# Patient Record
Sex: FEMALE | Race: WHITE | Employment: UNEMPLOYED | URBAN - NONMETROPOLITAN AREA
[De-identification: names, ages, dates, MRNs, and addresses within clinical notes are randomized per-mention and may not be internally consistent; named-entity substitution may affect disease eponyms.]

---

## 2021-06-07 ENCOUNTER — HOSPITAL ENCOUNTER (EMERGENCY)
Age: 71
Discharge: HOME | End: 2021-06-08
Attending: STUDENT IN AN ORGANIZED HEALTH CARE EDUCATION/TRAINING PROGRAM
Payer: MEDICARE

## 2021-06-07 DIAGNOSIS — T78.3XXA ANGIOEDEMA, INITIAL ENCOUNTER: ICD-10-CM

## 2021-06-07 DIAGNOSIS — R73.9 HYPERGLYCEMIA: Primary | ICD-10-CM

## 2021-06-07 PROCEDURE — 96372 THER/PROPH/DIAG INJ SC/IM: CPT

## 2021-06-07 PROCEDURE — 99284 EMERGENCY DEPT VISIT MOD MDM: CPT

## 2021-06-08 ENCOUNTER — APPOINTMENT (OUTPATIENT)
Dept: GENERAL RADIOLOGY | Age: 71
End: 2021-06-08
Payer: MEDICARE

## 2021-06-08 VITALS
BODY MASS INDEX: 30.24 KG/M2 | HEIGHT: 59 IN | HEART RATE: 103 BPM | DIASTOLIC BLOOD PRESSURE: 70 MMHG | OXYGEN SATURATION: 96 % | TEMPERATURE: 98.4 F | SYSTOLIC BLOOD PRESSURE: 116 MMHG | RESPIRATION RATE: 18 BRPM | WEIGHT: 150 LBS

## 2021-06-08 LAB
A/G RATIO: 1.2 (ref 1.1–2.2)
ALBUMIN SERPL-MCNC: 4.4 G/DL (ref 3.4–5)
ALP BLD-CCNC: 127 U/L (ref 40–129)
ALT SERPL-CCNC: 24 U/L (ref 10–40)
ANION GAP SERPL CALCULATED.3IONS-SCNC: 19 MMOL/L (ref 3–16)
ANION GAP SERPL CALCULATED.3IONS-SCNC: 20 MMOL/L (ref 3–16)
AST SERPL-CCNC: 18 U/L (ref 15–37)
BASE EXCESS VENOUS: -2.4 MMOL/L (ref -3–3)
BASOPHILS ABSOLUTE: 0 K/UL (ref 0–0.2)
BASOPHILS RELATIVE PERCENT: 0.1 %
BILIRUB SERPL-MCNC: 0.4 MG/DL (ref 0–1)
BILIRUBIN URINE: NEGATIVE
BLOOD, URINE: NEGATIVE
BUN BLDV-MCNC: 32 MG/DL (ref 7–20)
BUN BLDV-MCNC: 33 MG/DL (ref 7–20)
CALCIUM SERPL-MCNC: 10.3 MG/DL (ref 8.3–10.6)
CALCIUM SERPL-MCNC: 10.7 MG/DL (ref 8.3–10.6)
CARBOXYHEMOGLOBIN: 2.1 % (ref 0–1.5)
CHLORIDE BLD-SCNC: 93 MMOL/L (ref 99–110)
CHLORIDE BLD-SCNC: 98 MMOL/L (ref 99–110)
CLARITY: CLEAR
CO2: 21 MMOL/L (ref 21–32)
CO2: 22 MMOL/L (ref 21–32)
COLOR: YELLOW
CREAT SERPL-MCNC: 1.1 MG/DL (ref 0.6–1.2)
CREAT SERPL-MCNC: 1.3 MG/DL (ref 0.6–1.2)
EOSINOPHILS ABSOLUTE: 0 K/UL (ref 0–0.6)
EOSINOPHILS RELATIVE PERCENT: 0 %
GFR AFRICAN AMERICAN: 49
GFR AFRICAN AMERICAN: 59
GFR NON-AFRICAN AMERICAN: 40
GFR NON-AFRICAN AMERICAN: 49
GLOBULIN: 3.7 G/DL
GLUCOSE BLD-MCNC: 359 MG/DL (ref 70–99)
GLUCOSE BLD-MCNC: 377 MG/DL (ref 70–99)
GLUCOSE BLD-MCNC: 481 MG/DL (ref 70–99)
GLUCOSE BLD-MCNC: 525 MG/DL (ref 70–99)
GLUCOSE URINE: >=1000 MG/DL
HCO3 VENOUS: 22.9 MMOL/L (ref 23–29)
HCT VFR BLD CALC: 38.3 % (ref 36–48)
HEMOGLOBIN: 12.7 G/DL (ref 12–16)
KETONES, URINE: NEGATIVE MG/DL
LEUKOCYTE ESTERASE, URINE: NEGATIVE
LYMPHOCYTES ABSOLUTE: 0.5 K/UL (ref 1–5.1)
LYMPHOCYTES RELATIVE PERCENT: 4.5 %
MCH RBC QN AUTO: 30.8 PG (ref 26–34)
MCHC RBC AUTO-ENTMCNC: 33.1 G/DL (ref 31–36)
MCV RBC AUTO: 93 FL (ref 80–100)
METHEMOGLOBIN VENOUS: 0.3 %
MICROSCOPIC EXAMINATION: ABNORMAL
MONOCYTES ABSOLUTE: 0.2 K/UL (ref 0–1.3)
MONOCYTES RELATIVE PERCENT: 1.7 %
NEUTROPHILS ABSOLUTE: 9.4 K/UL (ref 1.7–7.7)
NEUTROPHILS RELATIVE PERCENT: 93.7 %
NITRITE, URINE: NEGATIVE
O2 CONTENT, VEN: 13 VOL %
O2 SAT, VEN: 69 %
O2 THERAPY: ABNORMAL
PCO2, VEN: 41.3 MMHG (ref 40–50)
PDW BLD-RTO: 13.5 % (ref 12.4–15.4)
PERFORMED ON: ABNORMAL
PERFORMED ON: ABNORMAL
PH UA: 5 (ref 5–8)
PH VENOUS: 7.36 (ref 7.35–7.45)
PLATELET # BLD: 257 K/UL (ref 135–450)
PMV BLD AUTO: 9 FL (ref 5–10.5)
PO2, VEN: 37.5 MMHG (ref 25–40)
POTASSIUM REFLEX MAGNESIUM: 3.6 MMOL/L (ref 3.5–5.1)
POTASSIUM REFLEX MAGNESIUM: 3.8 MMOL/L (ref 3.5–5.1)
PROTEIN UA: NEGATIVE MG/DL
RBC # BLD: 4.12 M/UL (ref 4–5.2)
SODIUM BLD-SCNC: 135 MMOL/L (ref 136–145)
SODIUM BLD-SCNC: 138 MMOL/L (ref 136–145)
SPECIFIC GRAVITY UA: 1.01 (ref 1–1.03)
TCO2 CALC VENOUS: 24 MMOL/L
TOTAL PROTEIN: 8.1 G/DL (ref 6.4–8.2)
URINE TYPE: ABNORMAL
UROBILINOGEN, URINE: 0.2 E.U./DL
WBC # BLD: 10 K/UL (ref 4–11)

## 2021-06-08 PROCEDURE — 2580000003 HC RX 258: Performed by: STUDENT IN AN ORGANIZED HEALTH CARE EDUCATION/TRAINING PROGRAM

## 2021-06-08 PROCEDURE — 36415 COLL VENOUS BLD VENIPUNCTURE: CPT

## 2021-06-08 PROCEDURE — 85025 COMPLETE CBC W/AUTO DIFF WBC: CPT

## 2021-06-08 PROCEDURE — 80053 COMPREHEN METABOLIC PANEL: CPT

## 2021-06-08 PROCEDURE — 6370000000 HC RX 637 (ALT 250 FOR IP): Performed by: STUDENT IN AN ORGANIZED HEALTH CARE EDUCATION/TRAINING PROGRAM

## 2021-06-08 PROCEDURE — 82803 BLOOD GASES ANY COMBINATION: CPT

## 2021-06-08 PROCEDURE — 71045 X-RAY EXAM CHEST 1 VIEW: CPT

## 2021-06-08 PROCEDURE — 81003 URINALYSIS AUTO W/O SCOPE: CPT

## 2021-06-08 RX ORDER — 0.9 % SODIUM CHLORIDE 0.9 %
1000 INTRAVENOUS SOLUTION INTRAVENOUS ONCE
Status: COMPLETED | OUTPATIENT
Start: 2021-06-08 | End: 2021-06-08

## 2021-06-08 RX ORDER — EPINEPHRINE 0.3 MG/.3ML
0.3 INJECTION SUBCUTANEOUS ONCE
Qty: 0.3 ML | Refills: 0 | Status: SHIPPED | OUTPATIENT
Start: 2021-06-08 | End: 2021-06-08

## 2021-06-08 RX ORDER — HYDROCHLOROTHIAZIDE 25 MG/1
25 TABLET ORAL DAILY
COMMUNITY

## 2021-06-08 RX ADMIN — SODIUM CHLORIDE 1000 ML: 9 INJECTION, SOLUTION INTRAVENOUS at 01:27

## 2021-06-08 RX ADMIN — INSULIN HUMAN 5 UNITS: 100 INJECTION, SOLUTION PARENTERAL at 01:22

## 2021-06-08 NOTE — PROGRESS NOTES
Pt is from out of town, she reports that the woke up yesterday with the right side of her face swollen, that today it was her lips and tongue, she went to the doctor's earlier today and received steroid and benedryl, and  Now her sugar is n 500's, per pt    She got a raspy cough as well

## 2021-06-08 NOTE — ED PROVIDER NOTES
[facility]      CHIEF COMPLAINT  Hyperglycemia (pt state her BGL was 566. Believes it is the result of a allergic reaction this morning after being given a benadryl shot)     HISTORY OF PRESENT ILLNESS  Sweta Min is a 65 y.o. female  who presents to the ED complaining of hyperglycemia. Patient states that she was seen earlier at urgent care for an allergic reaction that she believes was either secondary to tomatoes or strawberries. She has never had an allergic reaction either of these foods in the past, states that she developed some facial swelling as well as tongue and lip swelling at that time. At urgent care, received steroids and Benadryl, since then her swelling has been improving. She denies any associated itching. She states that her tongue no longer feels swollen. She does take losartan, has no previous history of angioedema. She denies any throat swelling sensation. She states that she has had elevated blood sugars in the 500s. She denies any nausea vomiting, abdominal pain, diarrhea constipation, or other complaints. No other complaints, modifying factors or associated symptoms. I have reviewed the following from the nursing documentation. Past Medical History:   Diagnosis Date    Diabetes mellitus ([address]     Hypertension     Iron (Fe) deficiency anemia      Past Surgical History:   Procedure Laterality Date    HYSTERECTOMY       No family history on file.   Social History     Socioeconomic History    Marital status:      Spouse name: Not on file    Number of children: Not on file    Years of education: Not on file    Highest education level: Not on file   Occupational History    Not on file   Tobacco Use    Smoking status: Never Smoker    Smokeless tobacco: Never Used   Vaping Use    Vaping Use: Never used   Substance and Sexual Activity    Alcohol use: Not Currently    Drug use: Never    Sexual activity: Not Currently   Other Topics Concern    Not on file   Social History Narrative    Not on file     Social Determinants of Health     Financial Resource Strain:     Difficulty of Paying Living Expenses:    Food Insecurity:     Worried About Running Out of Food in the Last Year:     [address] in the Last Year:    Transportation Needs:     Lack of Transportation (Medical):  Lack of Transportation (Non-Medical):    Physical Activity:     Days of Exercise per Week:     Minutes of Exercise per Session:    Stress:     Feeling of Stress :    Social Connections:     Frequency of Communication with Friends and Family:     Frequency of Social Gatherings with Friends and Family:     Attends Jewish Services:     Active Member of Clubs or Organizations:     Attends Club or Organization Meetings:     Marital Status:    Intimate Partner Violence:     Fear of Current or Ex-Partner:     Emotionally Abused:     Physically Abused:     Sexually Abused:      No current facility-administered medications for this encounter. Current Outpatient Medications   Medication Sig Dispense Refill    hydroCHLOROthiazide (HYDRODIURIL) 25 MG tablet Take 25 mg by mouth daily      metFORMIN (GLUCOPHAGE) 1000 MG tablet Take 1,000 mg by mouth 2 times daily (with meals)      EPINEPHrine (EPIPEN 2-ALEXANDER) 0.3 MG/0.3ML SOAJ injection Inject 0.3 mLs into the muscle once for 1 dose Use as directed for allergic reaction 0.3 mL 0     Allergies   Allergen Reactions    Codeine        REVIEW OF SYSTEMS  10 systems reviewed, pertinent positives per HPI otherwise noted to be negative. PHYSICAL EXAM  /70   Pulse 103   Temp 98.4 °F (36.9 °C) (Oral)   Resp 18   Ht 4' 11\" (1.499 m)   Wt 150 lb (68 kg)   SpO2 96%   BMI 30.30 kg/m²    GENERAL APPEARANCE: Awake and alert. Cooperative. No oacute distress. HENT: Normocephalic. Atraumatic. Mucous membranes are moist.  She does have swelling periorbitally bilaterally, as well as her lips.   No tongue Calcium 10.7 (H) 8.3 - 10.6 mg/dL    Total Protein 8.1 6.4 - 8.2 g/dL    Albumin 4.4 3.4 - 5.0 g/dL    Albumin/Globulin Ratio 1.2 1.1 - 2.2    Total Bilirubin 0.4 0.0 - 1.0 mg/dL    Alkaline Phosphatase 127 40 - 129 U/L    ALT 24 10 - 40 U/L    AST 18 15 - 37 U/L    Globulin 3.7 g/dL   Blood Gas, Venous   Result Value Ref Range    pH, Jaime 7.361 7.350 - 7.450    pCO2, Jaime 41.3 40.0 - 50.0 mmHg    pO2, Jaime 37.5 25 - 40 mmHg    HCO3, Venous 22.9 (L) 23.0 - 29.0 mmol/L    Base Excess, Jaime -2.4 -3.0 - 3.0 mmol/L    O2 Sat, Jaime 69 Not Established %    Carboxyhemoglobin 2.1 (H) 0.0 - 1.5 %    MetHgb, Jaime 0.3 <1.5 %    TC02 (Calc), Jaime 24 Not Established mmol/L    O2 Content, Jaime 13 Not Established VOL %    O2 Therapy Unknown    Urinalysis, reflex to microscopic   Result Value Ref Range    Color, UA Yellow Straw/Yellow    Clarity, UA Clear Clear    Glucose, Ur >=1000 (A) Negative mg/dL    Bilirubin Urine Negative Negative    Ketones, Urine Negative Negative mg/dL    Specific Gravity, UA 1.010 1.005 - 1.030    Blood, Urine Negative Negative    pH, UA 5.0 5.0 - 8.0    Protein, UA Negative Negative mg/dL    Urobilinogen, Urine 0.2 <2.0 E.U./dL    Nitrite, Urine Negative Negative    Leukocyte Esterase, Urine Negative Negative    Microscopic Examination Not Indicated     Urine Type NotGiven    Basic Metabolic Panel w/ Reflex to MG   Result Value Ref Range    Sodium 138 136 - 145 mmol/L    Potassium reflex Magnesium 3.6 3.5 - 5.1 mmol/L    Chloride 98 (L) 99 - 110 mmol/L    CO2 21 21 - 32 mmol/L    Anion Gap 19 (H) 3 - 16    Glucose 377 (H) 70 - 99 mg/dL    BUN 32 (H) 7 - 20 mg/dL    CREATININE 1.1 0.6 - 1.2 mg/dL    GFR Non- 49 (A) >60    GFR  59 (A) >60    Calcium 10.3 8.3 - 10.6 mg/dL   POCT Glucose   Result Value Ref Range    POC Glucose 481 (H) 70 - 99 mg/dl    Performed on ACCU-CHEK    POCT Glucose   Result Value Ref Range    POC Glucose 359 (H) 70 - 99 mg/dl    Performed on ACCU-CHEK RADIOLOGY  XR CHEST PORTABLE   Final Result   No acute process. Large hiatal hernia. ED COURSE/MDM  Patient seen and evaluated. Old records reviewed. Labs and imaging reviewed and results discussed with patient. Patient is a 25-year-old female, presenting with concerns for elevated blood glucose after receiving a steroid shot for possible allergic reaction earlier today. Full HPI as detailed above. Upon arrival in the ED, vitals remarkable for tachycardia which improved throughout her stay in the department. Vitals otherwise reassuring. Patient is resting comfortable and is in no acute distress. She does have periorbital swelling as well as swelling of her lips, states that she had tongue swelling earlier today which is since resolved. No current concern for airway compromise. She is worried that she may have had an allergic reaction to strawberries or tomatoes however she has no history of allergies to these substances. She denies any other new exposures. She does take losartan for blood pressure. Findings are consistent with angioedema, possibly secondary to losartan. Patient was advised to discontinue losartan and continue her other blood pressure medications until she is able to follow-up with her primary care provider. It is still possible that she could also be having an allergic reaction to the strawberries or tomatoes however I find that this is less likely. Patient was treated with IV fluids here in the department, as well as insulin with improvement of her blood glucose to the 300s. She has a very mild anion gap of 19 but no acidosis. Is not in DKA at this time. Patient was reassessed and stated that she was feeling well. She will be discharged home. She feels that her swelling is continuing to decrease.   She was given a referral for PCP follow-up as she does not live in the area, advised to discontinue her ARB until she is able to follow-up with her primary care provider. Patient is comfortable in agreement with plan of care and was discharged. Given return precautions. During the patient's ED course, the patient was given:  Medications   0.9 % sodium chloride bolus (0 mLs Intravenous Stopped 6/8/21 0300)   insulin regular (HUMULIN R;NOVOLIN R) injection 5 Units (5 Units Subcutaneous Given 6/8/21 0122)        CLINICAL IMPRESSION  1. Hyperglycemia    2. Angioedema, initial encounter        Blood pressure 116/70, pulse 103, temperature 98.4 °F (36.9 °C), temperature source Oral, resp. rate 18, height 4' 11\" (1.499 m), weight 150 lb (68 kg), SpO2 96 %. DISPOSITION  Reymundo Browne was discharged to home in good condition. Patient was given scripts for the following medications. I counseled patient how to take these medications. Discharge Medication List as of 6/8/2021  3:48 AM      START taking these medications    Details   EPINEPHrine (EPIPEN 2-ALEXANDER) 0.3 MG/0.3ML SOAJ injection Inject 0.3 mLs into the muscle once for 1 dose Use as directed for allergic reaction, Disp-0.3 mL, [id number]             Follow-up with:  Maegan Copeland  [phone]  Schedule an appointment as soon as possible for a visit       [facility]. [facility]  [address]  [phone]  Go to   If symptoms worsen      DISCLAIMER: This chart was created using Dragon dictation software. Efforts were made by me to ensure accuracy, however some errors may be present due to limitations of this technology and occasionally words are not transcribed correctly.        Shirley Foote MD  06/08/21 7980

## 2021-06-11 ENCOUNTER — OFFICE VISIT (OUTPATIENT)
Dept: FAMILY MEDICINE CLINIC | Age: 71
End: 2021-06-11
Payer: MEDICARE

## 2021-06-11 VITALS
OXYGEN SATURATION: 98 % | HEIGHT: 59 IN | BODY MASS INDEX: 29.56 KG/M2 | WEIGHT: 146.6 LBS | SYSTOLIC BLOOD PRESSURE: 138 MMHG | TEMPERATURE: 97.8 F | HEART RATE: 101 BPM | DIASTOLIC BLOOD PRESSURE: 74 MMHG

## 2021-06-11 DIAGNOSIS — R60.0 BILATERAL LEG EDEMA: ICD-10-CM

## 2021-06-11 DIAGNOSIS — T38.0X5A STEROID-INDUCED HYPERGLYCEMIA: ICD-10-CM

## 2021-06-11 DIAGNOSIS — T78.3XXS ANGIOEDEMA, SEQUELA: ICD-10-CM

## 2021-06-11 DIAGNOSIS — R73.9 STEROID-INDUCED HYPERGLYCEMIA: ICD-10-CM

## 2021-06-11 DIAGNOSIS — Z79.4 TYPE 2 DIABETES MELLITUS WITHOUT COMPLICATION, WITH LONG-TERM CURRENT USE OF INSULIN (HCC): ICD-10-CM

## 2021-06-11 DIAGNOSIS — Z76.89 ENCOUNTER TO ESTABLISH CARE WITH NEW DOCTOR: Primary | ICD-10-CM

## 2021-06-11 DIAGNOSIS — E11.9 TYPE 2 DIABETES MELLITUS WITHOUT COMPLICATION, WITH LONG-TERM CURRENT USE OF INSULIN (HCC): ICD-10-CM

## 2021-06-11 DIAGNOSIS — D50.0 IRON DEFICIENCY ANEMIA DUE TO CHRONIC BLOOD LOSS: ICD-10-CM

## 2021-06-11 DIAGNOSIS — I10 ESSENTIAL HYPERTENSION: ICD-10-CM

## 2021-06-11 PROCEDURE — 3017F COLORECTAL CA SCREEN DOC REV: CPT | Performed by: FAMILY MEDICINE

## 2021-06-11 PROCEDURE — 1123F ACP DISCUSS/DSCN MKR DOCD: CPT | Performed by: FAMILY MEDICINE

## 2021-06-11 PROCEDURE — 1036F TOBACCO NON-USER: CPT | Performed by: FAMILY MEDICINE

## 2021-06-11 PROCEDURE — G8417 CALC BMI ABV UP PARAM F/U: HCPCS | Performed by: FAMILY MEDICINE

## 2021-06-11 PROCEDURE — 4040F PNEUMOC VAC/ADMIN/RCVD: CPT | Performed by: FAMILY MEDICINE

## 2021-06-11 PROCEDURE — 99204 OFFICE O/P NEW MOD 45 MIN: CPT | Performed by: FAMILY MEDICINE

## 2021-06-11 PROCEDURE — G8427 DOCREV CUR MEDS BY ELIG CLIN: HCPCS | Performed by: FAMILY MEDICINE

## 2021-06-11 PROCEDURE — 3046F HEMOGLOBIN A1C LEVEL >9.0%: CPT | Performed by: FAMILY MEDICINE

## 2021-06-11 PROCEDURE — 2022F DILAT RTA XM EVC RTNOPTHY: CPT | Performed by: FAMILY MEDICINE

## 2021-06-11 PROCEDURE — 1090F PRES/ABSN URINE INCON ASSESS: CPT | Performed by: FAMILY MEDICINE

## 2021-06-11 PROCEDURE — G8400 PT W/DXA NO RESULTS DOC: HCPCS | Performed by: FAMILY MEDICINE

## 2021-06-11 RX ORDER — ATORVASTATIN CALCIUM 10 MG/1
TABLET, FILM COATED ORAL
COMMUNITY
Start: 2021-06-03

## 2021-06-11 RX ORDER — CEPHRADINE 500 MG
CAPSULE ORAL
COMMUNITY

## 2021-06-11 RX ORDER — INSULIN GLARGINE 300 U/ML
INJECTION, SOLUTION SUBCUTANEOUS
COMMUNITY
Start: 2021-06-03

## 2021-06-11 RX ORDER — ASPIRIN 325 MG
325 TABLET ORAL DAILY
COMMUNITY

## 2021-06-11 RX ORDER — LANCING DEVICE
EACH MISCELLANEOUS
COMMUNITY
Start: 2021-06-03

## 2021-06-11 RX ORDER — PREDNISONE 10 MG/1
TABLET ORAL
COMMUNITY
Start: 2021-06-07

## 2021-06-11 RX ORDER — GLUCOSAM/CHON-MSM1/C/MANG/BOSW 500-416.6
TABLET ORAL
COMMUNITY
Start: 2021-06-03

## 2021-06-11 RX ORDER — LOSARTAN POTASSIUM 100 MG/1
TABLET ORAL
COMMUNITY
Start: 2021-06-03 | End: 2021-06-11 | Stop reason: HOSPADM

## 2021-06-11 RX ORDER — AMLODIPINE BESYLATE 5 MG/1
TABLET ORAL
COMMUNITY
Start: 2021-06-03

## 2021-06-11 RX ORDER — CHLORTHALIDONE 25 MG/1
TABLET ORAL
COMMUNITY
Start: 2021-06-03

## 2021-06-11 RX ORDER — ASCORBIC ACID 125 MG
TABLET,CHEWABLE ORAL
COMMUNITY

## 2021-06-11 RX ORDER — HYDROCORTISONE 10 MG/G
GEL TOPICAL
COMMUNITY

## 2021-06-11 RX ORDER — METFORMIN HYDROCHLORIDE 500 MG/1
TABLET, EXTENDED RELEASE ORAL
COMMUNITY
Start: 2021-06-03

## 2021-06-11 RX ORDER — CHOLECALCIFEROL (VITAMIN D3) 25 MCG
TABLET ORAL
COMMUNITY

## 2021-06-11 RX ORDER — PEN NEEDLE, DIABETIC 32GX 5/32"
NEEDLE, DISPOSABLE MISCELLANEOUS
COMMUNITY
Start: 2021-06-03

## 2021-06-11 RX ORDER — CALCIUM CITRATE/VITAMIN D3 200MG-6.25
TABLET ORAL
COMMUNITY
Start: 2021-06-03

## 2021-06-11 ASSESSMENT — PATIENT HEALTH QUESTIONNAIRE - PHQ9
SUM OF ALL RESPONSES TO PHQ9 QUESTIONS 1 & 2: 0
SUM OF ALL RESPONSES TO PHQ QUESTIONS 1-9: 0
1. LITTLE INTEREST OR PLEASURE IN DOING THINGS: 0
SUM OF ALL RESPONSES TO PHQ QUESTIONS 1-9: 0
2. FEELING DOWN, DEPRESSED OR HOPELESS: 0
SUM OF ALL RESPONSES TO PHQ QUESTIONS 1-9: 0

## 2021-06-11 NOTE — PROGRESS NOTES
Cherie Porras   65 y.o. female   1950    This is patient's first visit with me. Cherie Porras is here to establish care as their new PCP. Cherie Porras has a PMH significant for:    Patient Active Problem List   Diagnosis    Essential hypertension    Type 2 diabetes mellitus without complication, with long-term current use of insulin (HCC)    Iron deficiency anemia due to chronic blood loss    Angio-edema       Reason(s) for visit:   Chief Complaint   Patient presents with    Follow-Up from Hospital     Glucose was over 500. [facility] 6/7.  Medication Reaction       HPI:    Office visit encounter:    Patient is originally from [place] and will be here for the next 4 months or so to take care of her sister who has esophageal CA. She has been here in [place] for 3 months and is living with a family member in [place], [place]. Patient's sister has been receiving radiation and has responded overall well to treatment based on latest PET scan. Patient came here today for ER follow up. She was seen at [facility] ER on 6/7/2021 for hyperglycemia and suspected angioedema. Patient reported that earlier that day, she went to Urgent Care for swelling of jaw. She thought it was attributed to a fall the day before. She woke up the following day and noted that her lower lip was swollen and shortly thereafter her tongue became swollen. She stated that she had \"hives\" occured around her waist and then later saw some under left breast and then spread around her diaphragm belt line. She was given Benadryl and steroid and was told that her glucose would increase. Her glucose was checked and it read over 500 and then went to ER. She was only given bolus NS and 5 units of Humulin R since her glucose was 525. Lab work was not suggestive of early DKA. Patient stated that she feels back to normal.  She was discharged with rx of EpiPen.   She stated that this issue has never happened before and has no idea of the cause behind it. As far as she knows, there's no [facility] of angiodema. This issue has never occurred before. She denied taking any new meds (both rx and OTC). She has history of HTN & DM. Last A1c was about a 7 months ago and was between 7-8. She's on Toujeo and Metformin. FBS are usually on average around 120-140. For HTN, patient was on Losartan, Amlodipine, and either Chorthalidone/HCTZ (?). Patient isn't sure which thiazide she's taking. She has been on these meds for years. She was advised to stop Losartan by ER. Patient also reported that she has history of iron deficiency and has been on IV iron infusion for 15 years. She stated that it was due to blood loss and had workup done, but no clear source of bleeding was found. Last IV iron therapy was about 18 months ago. She's not taking oral iron. Denied issues recently of hematochezia/melena as well as bleeding elsewhere.       PDMP monitoring:  -Revealed no controlled medications written of any kind.   -Last report:   Last PDMP Roberto Carlos as Reviewed [facility]):  Review User Review Instant Review Result   LORY FALK 6/10/2021 10:02 PM Reviewed PDMP [1]         Allergies   Allergen Reactions    Codeine     Losartan Hives and Swelling       Current Outpatient Medications on File Prior to Visit   Medication Sig Dispense Refill    amLODIPine (NORVASC) 5 MG tablet       atorvastatin (LIPITOR) 10 MG tablet       chlorthalidone (HYGROTON) 25 MG tablet       TOUJEO SOLOSTAR 300 UNIT/ML SOPN       metFORMIN (GLUCOPHAGE-XR) 500 MG extended release tablet       predniSONE (DELTASONE) 10 MG tablet       aspirin 325 MG tablet Take 325 mg by mouth daily      Alpha-Lipoic Acid 200 MG CAPS Take by mouth      Cyanocobalamin (B-12) 5000 MCG CAPS Take by mouth      Cholecalciferol (VITAMIN D3) 25 MCG TABS Take by mouth      diphenhydrAMINE HCl (BENADRYL ALLERGY PO) Take by mouth      HYDROCORTISONE, TOPICAL, (CORTIZONE-10) 1 % GEL Apply topically      hydroCHLOROthiazide (HYDRODIURIL) 25 MG tablet Take 25 mg by mouth daily      TRUE METRIX BLOOD GLUCOSE TEST strip       UNIFINE PENTIPS 32G X 4 MM MISC       Lancet Devices (LEADER ADVANCED LANCING DEVICE) MISC       TRUEplus Lancets 30G MISC       metFORMIN (GLUCOPHAGE) 1000 MG tablet Take 1,000 mg by mouth 2 times daily (with meals)      EPINEPHrine (EPIPEN 2-ALEXANDER) 0.3 MG/0.3ML SOAJ injection Inject 0.3 mLs into the muscle once for 1 dose Use as directed for allergic reaction 0.3 mL 0     No current facility-administered medications on file prior to visit. Family History   Problem Relation Age of Onset    Heart Attack Mother     Stroke Father     Esophageal Cancer Sister 76    Kidney Cancer Sister 59       Social History     Tobacco Use    Smoking status: Never Smoker    Smokeless tobacco: Never Used   Substance Use Topics    Alcohol use: Not Currently        Lab Results   Component Value Date    WBC 10.0 06/08/2021    HGB 12.7 06/08/2021    HCT 38.3 06/08/2021    MCV 93.0 06/08/2021     06/08/2021       Chemistry        Component Value Date/Time     06/08/2021 0300    K 3.6 06/08/2021 0300    CL 98 (L) 06/08/2021 0300    CO2 21 06/08/2021 0300    BUN 32 (H) 06/08/2021 0300    CREATININE 1.1 06/08/2021 0300        Component Value Date/Time    CALCIUM 10.3 06/08/2021 0300    ALKPHOS 127 06/08/2021 0015    AST 18 06/08/2021 0015    ALT 24 06/08/2021 0015    BILITOT 0.4 06/08/2021 0015          Lab Results   Component Value Date    ALT 24 06/08/2021    AST 18 06/08/2021    ALKPHOS 127 06/08/2021    BILITOT 0.4 06/08/2021     No results found for: LABA1C  No results found for: EAG    Review of Systems   Constitutional: Negative for activity change, appetite change, fatigue, fever and unexpected weight change. HENT: Negative for congestion, rhinorrhea, sinus pressure and trouble swallowing. Respiratory: Negative for cough, chest tightness, shortness of breath and wheezing. Cardiovascular: Negative for chest pain, palpitations and leg swelling. Gastrointestinal: Negative for abdominal distention, abdominal pain, blood in stool, constipation, diarrhea, nausea and vomiting. Genitourinary: Negative for dysuria, frequency and hematuria. Musculoskeletal: Negative for arthralgias and back pain. Skin: Negative for color change and rash. Neurological: Negative for dizziness, weakness, light-headedness, numbness and headaches. Psychiatric/Behavioral: Negative for sleep disturbance. Wt Readings from Last 3 Encounters:   06/11/21 146 lb 9.6 oz (66.5 kg)   06/08/21 150 lb (68 kg)       BP Readings from Last 3 Encounters:   06/11/21 138/74   06/08/21 116/70       Pulse Readings from Last 3 Encounters:   06/11/21 101   06/08/21 103       /74   Pulse 101   Temp 97.8 °F (36.6 °C)   Ht 4' 11\" (1.499 m)   Wt 146 lb 9.6 oz (66.5 kg)   SpO2 98%   BMI 29.61 kg/m²      Physical Exam  Vitals reviewed. Constitutional:       General: She is awake. She is not in acute distress. Appearance: She is overweight. She is not ill-appearing or diaphoretic. HENT:      Head: Normocephalic and atraumatic. Right Ear: External ear normal.      Left Ear: External ear normal.      Nose: Nose normal.   Eyes:      General: Lids are normal. Gaze aligned appropriately. No scleral icterus. Right eye: No discharge. Left eye: No discharge. Extraocular Movements: Extraocular movements intact. Conjunctiva/sclera: Conjunctivae normal.   Neck:      Trachea: Phonation normal.   Cardiovascular:      Rate and Rhythm: Normal rate and regular rhythm. Pulmonary:      Effort: Pulmonary effort is normal. No respiratory distress. Breath sounds: No wheezing, rhonchi or rales. Abdominal:      General: Abdomen is flat. There is no distension. Palpations: Abdomen is soft. Musculoskeletal:         General: No deformity. Normal range of motion.       Cervical back: Normal range of motion. No erythema. Right lower le+ Pitting Edema present. Left lower le+ Pitting Edema present. Skin:     Coloration: Skin is not cyanotic, jaundiced or pale. Findings: No abrasion, abscess, bruising, ecchymosis, erythema, signs of injury, laceration, lesion, petechiae, rash or wound. Neurological:      General: No focal deficit present. Mental Status: She is alert. Mental status is at baseline. GCS: GCS eye subscore is 4. GCS verbal subscore is 5. GCS motor subscore is 6. Coordination: Coordination normal.      Gait: Gait is intact. Psychiatric:         Attention and Perception: Attention and perception normal.         Mood and Affect: Mood and affect normal.         Speech: Speech normal.         Behavior: Behavior normal. Behavior is cooperative. Thought Content: Thought content normal.       Assessment/Plan:   Emmy Sicard was seen today for follow-up from hospital and medication reaction. Diagnoses and all orders for this visit:    Encounter to establish care with new doctor    Angioedema, sequela  Comments:  Reviewed causes of angioedema. Patient has not been on ace-inhibitor before. Based on current med list, will continue to hold off on ARB (though it confers much lower risk of angioedema than an ace-inhibitor). Recommended referral to allergist for comprehensive allergy testing. Patient was given rx for EpiPen by ER. Advised to use it in case symptoms recur. Orders:  -     External Referral To Allergy    Essential hypertension  Comments:  Continue Amlodipine and Chlorthalidone. Advised to take Amlodipine at bedtime and Chlorthalidone in morning for better 24 BP control. Type 2 diabetes mellitus without complication, with long-term current use of insulin ([facility])  Comments: Will need up-to-date lab work. For now, continue current medical regiment. Orders:  -     RENAL FUNCTION PANEL;  Future  -     Hemoglobin A1C; Future    Bilateral leg edema  Comments:  I don't think she's in acute HF. BLE edema partly related to Amlodipine use. Per patient, always has had baseline leg edema. Will check BNP level to assess for HF. Orders:        -     Brain natriuretic peptide (BNP) level; Future    Iron deficiency anemia due to chronic blood loss  -     CBC Auto Differential; Future  -     Ferritin; Future  -     Iron and TIBC; Future  -     Hepatic Function Panel; Future    Steroid-induced hyperglycemia       I reviewed the plan of care with the patient. Patient acknowledged understanding and agreed with plan of care overall. Medications Discontinued During This Encounter   Medication Reason    losartan (COZAAR) 100 MG tablet Stop Taking at Discharge        General information on medications:  -When it comes to medications, whether with starting or adding a new medication or increasing the dose of a current medication, the benefits and risks have to always be considered and weighed over, especially if one is taking other medications as well. -There are no medications that have no side effects and that there is always a risk involved with taking a medication.    -If a side effect were to occur with starting a new medication or with increasing the dose of a current medication that either the medication can be totally discontinued altogether or simply decrease the dose of it and if this would be the case a follow-up appointment would be deemed necessary.    -The drug allergy list will then be updated with the corresponding side effect(s) if it's deemed to be a true 'drug allergy'.     -The most common adverse effects of medication(s) were addressed at today's visit.    -Lastly, the coverage status of a medication may vary from insurance to insurance and the only way to verify if the medication is covered is to send an actual prescription in.    -The drug formulary of each insurance changes without any warning or notification to the healthcare provider let alone the pharmacy.  -The cost of medications vary from insurance to insurance and the cost is always subject to change just like the drug formulary. Estimated length of time of today's visit: 45 minutes    Follow-up: Return in about 3 months (around 9/11/2021) for DM routine 3 mo visit. .     Patient was informed that if his or her symptoms worsen to follow up with me sooner or go to the nearest ER if the symptoms are very significant and warrant higher level of care. Regarding my note: This note was composed (by me only and not with assistance via a scribe) to the best of my knowledge and recollection of the encounter with the patient using one of my own customized note templates utilizing a combination of typing and dictating with the [address] [place] [address] speech recognition software. As a result, the note may possibly have various errors (e.g. spelling, grammar, and non-sensible words/phrases/statements) despite reviewing the note prior to signing it for completion. It is worth noting that most of the time, progress notes are completed usually long after the patient was seen. Every effort is made to have notes as well as other things that needed to be attended to (e.g. medication refills, MyChart messages, documents that require reviewing, etc.) be addressed and completed in a timely fashion (ideally within 72 hours of the patient encounter). It is also worth noting that healthcare providers often see several patients a day (e.g. > 15-30 patients/day) in either the outpatient and/or inpatient setting(s).   In addition, healthcare providers spend a significant amount of time reviewing multiple patients' charts in preparation for their future encounters (pre-charting) as well as time spent reviewing any labs, imaging, specialist's notes (if relevant), and other documents (e.g. recent ER visits or hospital stays or completing forms such as FMLA, short-term/long-term disability), etc.  Time is also allocated to attending to patient's messages on [employer], phone calls from various people, attending to prescription refills/orders, and also attending meetings or conferences throughout the day. Time and effort is also allocated to coordinating with office staff to make sure various things are completed as part of the day-to-day office management responsibilities. For the most part, substantial portion of each given day is usually spent with direct patient care followed by documenting. Given all this, it is worth pointing out that not every detail of the encounter can be remembered and not everything discussed is considered relevant, significant, or noteworthy. It is also worth mentioning that my recollection of the visit may differ from the respective patient's recollection of the visit. This note is primarily written for myself (the healthcare provider) utilizing one of my own customized templates so I can recall what happened during that visit and may be used for future reference for myself to prepare for follow up appointments. My note is also written in mind for other healthcare professionals (who have their own extensive medical background and experience) for their own understanding (of what happened during the visit) and also more importantly to hopefully help influence and play a role in formulating their plan of care along with their own unique medical expertise. If one has any questions or concerns about my given note, please take into consideration all these aforementioned things before contacting. Osmany Mcgrath M.D.   [address] [place] [address]    Electronically signed by Chastity Dubose on 6/12/2021 at 10:13 AM.

## 2021-06-11 NOTE — PATIENT INSTRUCTIONS
Verify if either on Chlorthalidone or Hydrochlorthiazide. These meds are diuretics and are in the SAME drug class. Cannot take them together. Increase Toujeo from 22 units to 30 units    Stop Losartan. Take Amlodipine in the evening. Chlorthalidone in the morning.

## 2021-06-12 PROBLEM — E11.9 TYPE 2 DIABETES MELLITUS WITHOUT COMPLICATION, WITH LONG-TERM CURRENT USE OF INSULIN (HCC): Status: ACTIVE | Noted: 2021-06-12

## 2021-06-12 PROBLEM — T78.3XXA ANGIO-EDEMA: Status: ACTIVE | Noted: 2021-06-12

## 2021-06-12 PROBLEM — I10 ESSENTIAL HYPERTENSION: Status: ACTIVE | Noted: 2021-06-12

## 2021-06-12 PROBLEM — D50.0 IRON DEFICIENCY ANEMIA DUE TO CHRONIC BLOOD LOSS: Status: ACTIVE | Noted: 2021-06-12

## 2021-06-12 PROBLEM — Z79.4 TYPE 2 DIABETES MELLITUS WITHOUT COMPLICATION, WITH LONG-TERM CURRENT USE OF INSULIN (HCC): Status: ACTIVE | Noted: 2021-06-12

## 2021-06-12 ASSESSMENT — ENCOUNTER SYMPTOMS
COLOR CHANGE: 0
NAUSEA: 0
VOMITING: 0
BLOOD IN STOOL: 0
ABDOMINAL DISTENTION: 0
CONSTIPATION: 0
WHEEZING: 0
ABDOMINAL PAIN: 0
DIARRHEA: 0
TROUBLE SWALLOWING: 0
CHEST TIGHTNESS: 0
SHORTNESS OF BREATH: 0
BACK PAIN: 0
COUGH: 0
RHINORRHEA: 0
SINUS PRESSURE: 0

## 2021-06-13 ENCOUNTER — HOSPITAL ENCOUNTER (OUTPATIENT)
Age: 71
Discharge: HOME | End: 2021-06-13
Payer: MEDICARE

## 2021-06-14 ENCOUNTER — HOSPITAL ENCOUNTER (OUTPATIENT)
Age: 71
Discharge: HOME | End: 2021-06-14
Payer: MEDICARE

## 2021-06-14 DIAGNOSIS — Z79.4 TYPE 2 DIABETES MELLITUS WITHOUT COMPLICATION, WITH LONG-TERM CURRENT USE OF INSULIN (HCC): Primary | ICD-10-CM

## 2021-06-14 DIAGNOSIS — E11.9 TYPE 2 DIABETES MELLITUS WITHOUT COMPLICATION, WITH LONG-TERM CURRENT USE OF INSULIN (HCC): Primary | ICD-10-CM

## 2021-06-14 DIAGNOSIS — D50.0 IRON DEFICIENCY ANEMIA DUE TO CHRONIC BLOOD LOSS: ICD-10-CM

## 2021-06-14 LAB
ALBUMIN SERPL-MCNC: 4.2 G/DL (ref 3.4–5)
ALP BLD-CCNC: 95 U/L (ref 40–129)
ALT SERPL-CCNC: 18 U/L (ref 10–40)
AST SERPL-CCNC: 13 U/L (ref 15–37)
BASOPHILS ABSOLUTE: 0.1 K/UL (ref 0–0.2)
BASOPHILS RELATIVE PERCENT: 0.6 %
BILIRUB SERPL-MCNC: 0.4 MG/DL (ref 0–1)
BILIRUBIN DIRECT: <0.2 MG/DL (ref 0–0.3)
BILIRUBIN, INDIRECT: ABNORMAL MG/DL (ref 0–1)
EOSINOPHILS ABSOLUTE: 0.1 K/UL (ref 0–0.6)
EOSINOPHILS RELATIVE PERCENT: 1.5 %
FERRITIN: 158.9 NG/ML (ref 15–150)
HCT VFR BLD CALC: 39.7 % (ref 36–48)
HEMOGLOBIN: 13.2 G/DL (ref 12–16)
IRON SATURATION: 29 % (ref 15–50)
IRON: 80 UG/DL (ref 37–145)
LYMPHOCYTES ABSOLUTE: 2.3 K/UL (ref 1–5.1)
LYMPHOCYTES RELATIVE PERCENT: 23.5 %
MCH RBC QN AUTO: 30.6 PG (ref 26–34)
MCHC RBC AUTO-ENTMCNC: 33.2 G/DL (ref 31–36)
MCV RBC AUTO: 92.1 FL (ref 80–100)
MONOCYTES ABSOLUTE: 0.9 K/UL (ref 0–1.3)
MONOCYTES RELATIVE PERCENT: 9.3 %
NEUTROPHILS ABSOLUTE: 6.5 K/UL (ref 1.7–7.7)
NEUTROPHILS RELATIVE PERCENT: 65.1 %
PDW BLD-RTO: 13.5 % (ref 12.4–15.4)
PLATELET # BLD: 353 K/UL (ref 135–450)
PMV BLD AUTO: 7.5 FL (ref 5–10.5)
RBC # BLD: 4.31 M/UL (ref 4–5.2)
TOTAL IRON BINDING CAPACITY: 276 UG/DL (ref 260–445)
TOTAL PROTEIN: 7.4 G/DL (ref 6.4–8.2)
WBC # BLD: 9.9 K/UL (ref 4–11)

## 2021-06-14 PROCEDURE — 80076 HEPATIC FUNCTION PANEL: CPT

## 2021-06-14 PROCEDURE — 83550 IRON BINDING TEST: CPT

## 2021-06-14 PROCEDURE — 85025 COMPLETE CBC W/AUTO DIFF WBC: CPT

## 2021-06-14 PROCEDURE — 36415 COLL VENOUS BLD VENIPUNCTURE: CPT

## 2021-06-14 PROCEDURE — 82728 ASSAY OF FERRITIN: CPT

## 2021-06-14 PROCEDURE — 83540 ASSAY OF IRON: CPT

## 2021-06-16 DIAGNOSIS — I10 ESSENTIAL HYPERTENSION: ICD-10-CM

## 2021-06-16 DIAGNOSIS — R60.0 BILATERAL LEG EDEMA: ICD-10-CM

## 2021-06-16 DIAGNOSIS — E11.9 TYPE 2 DIABETES MELLITUS WITHOUT COMPLICATION, WITH LONG-TERM CURRENT USE OF INSULIN (HCC): ICD-10-CM

## 2021-06-16 DIAGNOSIS — Z79.4 TYPE 2 DIABETES MELLITUS WITHOUT COMPLICATION, WITH LONG-TERM CURRENT USE OF INSULIN (HCC): ICD-10-CM

## 2021-06-16 NOTE — RESULT ENCOUNTER NOTE
Patient calling for bloodwork results. Advised patient will check on status of her other orders with the lab. Per Dr. Jerad Anguiano we have not received results for A1C, BMP, and renal function. We will call her back once received and resulted.

## 2021-06-18 ENCOUNTER — TELEPHONE (OUTPATIENT)
Dept: FAMILY MEDICINE CLINIC | Age: 71
End: 2021-06-18

## 2021-06-18 NOTE — TELEPHONE ENCOUNTER
Patient had called on 06/16/21 for lab results. Dr. Tiffanie Mcgrath had asked us to call lab to see why tests weren't done. German Meza was contacting lab. Lotus calling lab now.   Patient would like a call back when information obtain and results are ready at [phone]

## 2021-06-18 NOTE — TELEPHONE ENCOUNTER
Called and spoke with the lab to see if the received the lab add on orders that were faxed to them on 6/16. (See other encounter). She stated that they can add on the BNP but renal and A1C will need to be redrawn. The lavender top tube was sent to [place] and they would need that to run the A1C.

## 2021-06-18 NOTE — TELEPHONE ENCOUNTER
Patient calling again to check on status. Advised patient Adele Mayes states lab unable to run tests. She wants to talk with Dr. Marquez Galicia. Will call her back.

## 2021-06-18 NOTE — TELEPHONE ENCOUNTER
----- Message from Gregg Smith sent at 6/17/2021  4:50 PM EDT -----  Subject: Results Request    QUESTIONS  Which lab or imaging result is the patient calling about? All of the blood   test results   Which provider ordered the test? Maura Mars   At what location was the test performed? [facility]  Date the test was performed? 2021-06-14  Additional Information for Provider?   ---------------------------------------------------------------------------  --------------  [address]  What is the best way for the office to contact you? OK to leave message on   voicemail  Preferred Call Back Phone Number?  [phone]

## 2021-06-18 NOTE — TELEPHONE ENCOUNTER
I left voice message to patient discussing her results. I just encouraged her to have her labs redrawn. Addendum: discussed labs with patient. Osmany Lizama

## 2021-06-20 ENCOUNTER — HOSPITAL ENCOUNTER (OUTPATIENT)
Age: 71
Discharge: HOME | End: 2021-06-20
Payer: MEDICARE

## 2021-06-20 LAB
ALBUMIN SERPL-MCNC: 4 G/DL (ref 3.4–5)
ANION GAP SERPL CALCULATED.3IONS-SCNC: 8 MMOL/L (ref 3–16)
BUN BLDV-MCNC: 22 MG/DL (ref 7–20)
CALCIUM SERPL-MCNC: 9.7 MG/DL (ref 8.3–10.6)
CHLORIDE BLD-SCNC: 100 MMOL/L (ref 99–110)
CO2: 30 MMOL/L (ref 21–32)
CREAT SERPL-MCNC: 0.8 MG/DL (ref 0.6–1.2)
GFR AFRICAN AMERICAN: >60
GFR NON-AFRICAN AMERICAN: >60
GLUCOSE BLD-MCNC: 280 MG/DL (ref 70–99)
PHOSPHORUS: 2.6 MG/DL (ref 2.5–4.9)
POTASSIUM SERPL-SCNC: 3.6 MMOL/L (ref 3.5–5.1)
PRO-BNP: 89 PG/ML (ref 0–124)
SODIUM BLD-SCNC: 138 MMOL/L (ref 136–145)

## 2021-06-20 PROCEDURE — 36415 COLL VENOUS BLD VENIPUNCTURE: CPT

## 2021-06-20 PROCEDURE — 83880 ASSAY OF NATRIURETIC PEPTIDE: CPT

## 2021-06-20 PROCEDURE — 83036 HEMOGLOBIN GLYCOSYLATED A1C: CPT

## 2021-06-20 PROCEDURE — 80069 RENAL FUNCTION PANEL: CPT

## 2021-06-21 LAB
ESTIMATED AVERAGE GLUCOSE: 194.4 MG/DL
HBA1C MFR BLD: 8.4 %

## 2021-06-23 ENCOUNTER — TELEPHONE (OUTPATIENT)
Dept: FAMILY MEDICINE CLINIC | Age: 71
End: 2021-06-23

## 2021-06-23 NOTE — TELEPHONE ENCOUNTER
Niece, Olga Lebronjoyce, not on her HIPAA, calling for patient. She is at patient's home. Put her on speaker phone and coworker, Yessi Love, [address] [place] [address], verified with me and patient and niece ok to speak to niece. Advised need to update form. Can stop in office or download from [employer]. Niece stating patient does not want to do a telephone visit. Read to her Dr. Meir Fernandez note. Advised Dr. Meir Fernandez wanted to discuss as patient's A1C is uncontrolled and wanted to discuss treatment options. This is why he would like to do the call. Roseenriqueta is asking what A1C value was advised result was 8.4. Niece states elevation is due to her being on steroids. Also advised BUN value. She states patient is here locally for 6 months. She is going to call her PCP out in [place] with info and see what he suggests. Told them I would advise Dr. Meir Fernandez on their decision.

## 2022-11-03 ENCOUNTER — TELEPHONE (OUTPATIENT)
Dept: FAMILY MEDICINE CLINIC | Age: 72
End: 2022-11-03

## 2022-11-03 NOTE — TELEPHONE ENCOUNTER
Spoke to patient, patient stated that she no longer lives in [place], she is living in [place] now, will no longer be patient of Dr. Terri Pierce.